# Patient Record
Sex: FEMALE | Race: WHITE | NOT HISPANIC OR LATINO | Employment: FULL TIME | URBAN - METROPOLITAN AREA
[De-identification: names, ages, dates, MRNs, and addresses within clinical notes are randomized per-mention and may not be internally consistent; named-entity substitution may affect disease eponyms.]

---

## 2019-07-13 LAB
EXTERNAL HIV SCREEN: NORMAL
HCV AB SER-ACNC: <0.1

## 2019-07-16 ENCOUNTER — TRANSCRIBE ORDERS (OUTPATIENT)
Dept: PERINATAL CARE | Facility: CLINIC | Age: 34
End: 2019-07-16

## 2019-07-16 DIAGNOSIS — O09.899 SUPERVISION OF OTHER HIGH RISK PREGNANCIES, UNSPECIFIED TRIMESTER: Primary | ICD-10-CM

## 2019-07-31 RX ORDER — ALBUTEROL SULFATE 90 UG/1
2 AEROSOL, METERED RESPIRATORY (INHALATION) EVERY 6 HOURS PRN
COMMUNITY

## 2019-07-31 RX ORDER — FAMOTIDINE 20 MG/1
20 TABLET, FILM COATED ORAL 2 TIMES DAILY
COMMUNITY

## 2019-07-31 RX ORDER — TRAZODONE HYDROCHLORIDE 100 MG/1
25 TABLET ORAL
COMMUNITY

## 2019-07-31 RX ORDER — FEXOFENADINE HCL 180 MG/1
180 TABLET ORAL DAILY
COMMUNITY

## 2019-07-31 RX ORDER — ESCITALOPRAM OXALATE 10 MG/1
10 TABLET ORAL DAILY
COMMUNITY

## 2019-08-05 ENCOUNTER — OFFICE VISIT (OUTPATIENT)
Dept: PERINATAL CARE | Facility: CLINIC | Age: 34
End: 2019-08-05
Payer: COMMERCIAL

## 2019-08-05 ENCOUNTER — ROUTINE PRENATAL (OUTPATIENT)
Dept: PERINATAL CARE | Facility: CLINIC | Age: 34
End: 2019-08-05

## 2019-08-05 VITALS
HEART RATE: 82 BPM | HEIGHT: 60 IN | BODY MASS INDEX: 24.94 KG/M2 | WEIGHT: 127 LBS | DIASTOLIC BLOOD PRESSURE: 60 MMHG | SYSTOLIC BLOOD PRESSURE: 100 MMHG

## 2019-08-05 VITALS
BODY MASS INDEX: 24.94 KG/M2 | HEIGHT: 60 IN | DIASTOLIC BLOOD PRESSURE: 60 MMHG | WEIGHT: 127 LBS | HEART RATE: 82 BPM | SYSTOLIC BLOOD PRESSURE: 100 MMHG

## 2019-08-05 DIAGNOSIS — Z3A.12 12 WEEKS GESTATION OF PREGNANCY: ICD-10-CM

## 2019-08-05 DIAGNOSIS — O09.899 SUPERVISION OF OTHER HIGH RISK PREGNANCIES, UNSPECIFIED TRIMESTER: ICD-10-CM

## 2019-08-05 DIAGNOSIS — F32.A DEPRESSION AFFECTING PREGNANCY IN FIRST TRIMESTER, ANTEPARTUM: ICD-10-CM

## 2019-08-05 DIAGNOSIS — O09.299 PREVIOUS PREGNANCY COMPLICATED BY CHROMOSOMAL ABNORMALITY, ANTEPARTUM: Primary | ICD-10-CM

## 2019-08-05 DIAGNOSIS — O35.9XX0 TERATOGEN EXPOSURE IN CURRENT PREGNANCY, SINGLE OR UNSPECIFIED FETUS: ICD-10-CM

## 2019-08-05 DIAGNOSIS — O99.341 DEPRESSION AFFECTING PREGNANCY IN FIRST TRIMESTER, ANTEPARTUM: ICD-10-CM

## 2019-08-05 DIAGNOSIS — Z31.5 ENCOUNTER FOR PROCREATIVE GENETIC COUNSELING: ICD-10-CM

## 2019-08-05 PROCEDURE — 76801 OB US < 14 WKS SINGLE FETUS: CPT | Performed by: OBSTETRICS & GYNECOLOGY

## 2019-08-05 PROCEDURE — 76813 OB US NUCHAL MEAS 1 GEST: CPT | Performed by: OBSTETRICS & GYNECOLOGY

## 2019-08-05 PROCEDURE — 99241 PR OFFICE CONSULTATION NEW/ESTAB PATIENT 15 MIN: CPT | Performed by: OBSTETRICS & GYNECOLOGY

## 2019-08-05 RX ORDER — LORATADINE 10 MG/1
TABLET ORAL
COMMUNITY
Start: 2019-07-25

## 2019-08-05 NOTE — LETTER
August 7, 2019     Velia Donahue, [address] [address]    Patient: Daryle Sable   YOB: 1985   Date of Visit: 8/5/2019       Dear Dr Regan List: Thank you for referring Daryle Sable to me for evaluation  Below are my notes for this consultation  If you have questions, please do not hesitate to call me  I look forward to following your patient along with you  Sincerely,        Cleveland Miles MD        CC: No Recipients  Cleveland Miles MD  8/7/2019  1:25 PM  Sign at close encounter  Please see her ultrasound report in a separate report    Cleveland Miles MD

## 2019-08-05 NOTE — LETTER
August 5, 2019     Ramonita Moran, [address] [facility] [address]    Patient: Ramon Aldana   YOB: 1985   Date of Visit: 8/5/2019       Dear Dr Vicki Lancaster: Thank you for referring Ramon Aldana to me for evaluation  Below are my notes for this consultation  If you have questions, please do not hesitate to call me  I look forward to following your patient along with you  Sincerely,        Corin Monzon        CC: No Recipients  Kim Alanis MD  8/5/2019  9:36 AM  Sign at close encounter    Please see her ultrasound report in a separate report    Kim Alanis MD

## 2019-08-05 NOTE — PROGRESS NOTES
Genetic Counseling   High-Risk Gestation Note    Appointment Date:  2019  Referred By: JOSE Meza  YOB: 1985  Partner:  Robb Rubalcava     Indication for Visit:  personal and/or family history of chromosomal abnormality:  Previous pregnancy with Trisomy 25 and personal and/or family history of genetic disorder:  Patient is carrier of spinal muscular atrophy  Pregnancy History:   Estimated Date of Delivery: 20  Estimated Gestational Age: 14w9d     Genetic Counseling: Miller Walsh is a 29year old female who is here to discuss risks related to a previous pregnancy with Trisomy 25  Issues Discussed:  average population risk- 3-4% in the average pregnancy of serious condition or birth defect  2-3% risk of mental retardation  Not all detected by prenatal testing  Chromosomal: non-disjunction- /535 for Down syndrome, ,458 for Trisomy 25, and /286 for any chromosome abnormality at age 36 at delivery  Other disorders/disease: Patient carrier for spinal muscular atrophy (SMA), FOB tested negative  Clinical course and variability of spinal muscular atrophy and Trisomy 18  Risk to future pregnancies:  increased risk of 1% for any chromosome abnormality, decreased risk for SMA  Options Discussed:  Amniocentesis-risks and limitations discussed  CVS-Risks and limitations discussed  Level II ultrasound to screen for structural anomalies  Serum AFP screen recommended at 15-17 weeks to check for open neural tube defects  Cell free fetal DNA testing  Additional Information / Impression:  Miller Walsh is a 29 y o  female who presented with her  Shamika Maldonado for genetic counseling to discuss risks related to a previous pregnancy with Trisomy 25  The couple's first pregnancy was found to have no fetal heart tones on an 11week ultrasound  A D&E procedure was performed and genetic testing on products of conception was done which confirmed Trisomy 18    A copy of these results was not available to review at the time of our counseling session  We reviewed the morbidity and mortality of Trisomy 18 as well as the typical etiology of nondisjunction  We discussed that the risk to the current pregnancy for any chromosome abnormality (including Trisomy 25) is elevated over the patient's age related risk to about 1%  The available testing and screening options for chromosome abnormalities were then discussed  The risks, benefits, and limitations of amniocentesis were discussed with the patient  Amniocentesis is performed under direct real time ultrasound visualization to avoid both the fetus and the placenta  Once amniotic fluid is withdrawn, laboratory analysis is performed and amniotic fluid alpha-fetoprotein, as well as chromosome analysis is undertaken  The risk of genetic amniocentesis includes, but is not limited to less than 1 in 300 pregnancy loss rate or  delivery rate if 23 weeks or greater, infection, bleeding, rupture of membranes, failure of cells to grow, karyotype error, laboratory error, etc   Occasionally a repeat amniocentesis is necessary due to cell culture failure  Chromosome analysis from amniocentesis is 99 9% accurate and alpha-fetoprotein analysis can detect approximately 95% of open neural tube defects  Chorionic villus sampling (CVS) is another diagnostic testing option that is available earlier than amniocentesis, between 10-14 weeks gestation  Like amniocentesis, CVS is 99% accurate for detecting chromosomal problems  Unlike amniocentesis, CVS cannot detect alpha-fetoprotein levels in order to determine the risk for open neural tube defects  MSAFP testing would need to be performed a 15-20 weeks gestation for this purpose  The risk of CVS includes, but is not limited to, less than a 1 in 300 risk for pregnancy loss    There is also a 1% risk for maternal cell contamination and cell culture failure, in which case the CVS would need to be followed-up with amniocentesis  We reviewed the testing option of cell free fetal DNA screening (also known as noninvasive prenatal testing or NIPT)  We discussed that it is a serum test to identify fragments of fetal DNA in maternal blood  We reviewed the benefits and limitations of cell free fetal DNA screening in detecting Down syndrome, Trisomy 13, Trisomy 25 and sex chromosome aneuploidies  We also discussed that cell free fetal DNA screening does not detect additional chromosomal abnormalities and the possibility of a failed test result  As cell free fetal DNA screening does not detect open neural tube defects, MSAFP screening is available at 15-20 weeks gestation  After discussing the available prenatal screening and testing options Tyrus Apley elected to pursue cell free fetal DNA screening  Her blood was drawn immediately after the counseling session for Celia Ovalle  Results take approximately 7-10 days  The patient declined CVS and amniocentesis secondary to procedural related complications  She may reconsider diagnostic testing should the cell free fetal DNA screening come back abnormal   Tyrus Apley is also planning on pursuing MSAFP screening and Level II ultrasound at the appropriate times  Tyrus Apley states that her mother had three or four early pregnancy losses, etiology unknown  Further review of family history for the patient and her partner was noncontributory  The family history was not significant for other genetic diseases or disorders, intellectual disability, birth defects, fetal loss, or consanguinity  Patient reports being of / decent and that her  is of [place] decent  She denies either of them having known Ashkenazi Mu-ism ancestry  Tyrus Apley was found to be a carrier of spinal muscular atrophy (SMA) on expanded carrier screening done by Dr Марина guevara while they were undergoing infertility treatment prior to their first pregnancy    Alinasilvia Luna had SMA carrier screening performed and was negative  A copy of these results was not available to review, however we discussed that the residual risk for an affected pregnancy is low  Lastly, we discussed the fact that everyone in the general population regardless of age, family history, or medical background has approximately a 3-5% risk of having a child with some type of congenital anomaly, genetic disease or intellectual disability  Currently there are no tests available to rule out all birth defects or health problems  Hogan Oliva was provided with our contact information  I encouraged her to call with any questions or concerns  Time spent with Genetic Counselor: 30 minutes    Plan / Tests Ordered:  1) Patient declined CVS and amniocentesis  2) Patient elected cell free fetal DNA testing - [id number] drawn after counseling session  3) MSAFP screening at 15-20 weeks gestation  4) Level II ultrasound at approximately 20 weeks gestation

## 2019-08-08 ENCOUNTER — TELEPHONE (OUTPATIENT)
Dept: PERINATAL CARE | Facility: CLINIC | Age: 34
End: 2019-08-08

## 2019-08-08 NOTE — TELEPHONE ENCOUNTER
Called patient OB and spoke with Roger Bradshaw about faxing over patient records  Looking for any testing from Dr Randall [facility] office, CF, SMA, chromosome analysis, and genetic testing from prior pregnancy that had Trisomy 25  She will fax over any results she has

## 2019-08-12 ENCOUNTER — TELEPHONE (OUTPATIENT)
Dept: PERINATAL CARE | Facility: CLINIC | Age: 34
End: 2019-08-12

## 2019-08-12 NOTE — TELEPHONE ENCOUNTER
Telephone call to patient  VM identified correct number but no personal identifier  Left message for her to return call to discuss results  Completed and mailed TRF for MSALUKE

## 2019-08-12 NOTE — LETTER
19    Tyler Valdez  : 1985    Thank you for completing the cell-free fetal DNA screen ("non-invasive prenatal screening" or "[id number]")  To obtain comprehensive screening results, please complete blood work for MSAFP (maternal-serum alpha fetoprotein) which is screening for open neural tube defects (or spina bifida), between the weeks of ________ to ________  Based on your insurance coverage, please use one of the following locations  Call our office for any questions at [phone] or [phone]      [facility] [address]   [address] [place], [facility], [address]    Phone: [address]  [address] [facility], [place], [facility] [address] [place]    Phone: [address]  [address]  Phone: [phone] [address]  [facility], [address]   Phone: [phone] Santosh Yanes for lab)    [address] [facility]  [address]  [facility], [address] [facility]; [place], [address]   Phone: [address] [facility] [address]  [address]   Phone: [phone]    Sincerely,    Shannan Keller, [address] Nurse    Karine Valles MS, [facility]   Genetic Counselor

## 2019-08-12 NOTE — TELEPHONE ENCOUNTER
Pt called office at [phone] requesting results of MaterniT 21  Pt states Christin left a VMM to call our office  At [phone] I returned pts call and provided her with the results  PT requests gender which was confirmed with   Pt instructed on MSAFP  Pt receptive to all information and declines questions at this time

## 2019-08-22 ENCOUNTER — TELEPHONE (OUTPATIENT)
Dept: PERINATAL CARE | Facility: CLINIC | Age: 34
End: 2019-08-22

## 2019-08-22 NOTE — TELEPHONE ENCOUNTER
Patient left message asking about second blood draw  Wanted to know if she needs to make an appointment  Called her back, no answer  Voicemail did not have any identifying info thus a message was left for her to call me back

## 2019-09-10 ENCOUNTER — TELEPHONE (OUTPATIENT)
Dept: PERINATAL CARE | Facility: CLINIC | Age: 34
End: 2019-09-10

## 2019-09-10 NOTE — TELEPHONE ENCOUNTER
Left VMM on # provided on communication consent  PT had not viewed results on My Chart, results of MSAFP provided  Instructed to contact office with questions

## 2019-09-10 NOTE — TELEPHONE ENCOUNTER
----- Message from Vicky Jacob MD sent at 9/9/2019 10:19 PM EDT -----  Patient updated with her lab results through my chart

## 2019-09-30 ENCOUNTER — ROUTINE PRENATAL (OUTPATIENT)
Dept: PERINATAL CARE | Facility: CLINIC | Age: 34
End: 2019-09-30
Payer: COMMERCIAL

## 2019-09-30 VITALS
HEIGHT: 60 IN | DIASTOLIC BLOOD PRESSURE: 58 MMHG | WEIGHT: 135.2 LBS | SYSTOLIC BLOOD PRESSURE: 96 MMHG | HEART RATE: 83 BPM | BODY MASS INDEX: 26.55 KG/M2

## 2019-09-30 DIAGNOSIS — O09.299 PREVIOUS PREGNANCY COMPLICATED BY CHROMOSOMAL ABNORMALITY, ANTEPARTUM: ICD-10-CM

## 2019-09-30 DIAGNOSIS — Z3A.20 20 WEEKS GESTATION OF PREGNANCY: Primary | ICD-10-CM

## 2019-09-30 PROCEDURE — 76817 TRANSVAGINAL US OBSTETRIC: CPT | Performed by: OBSTETRICS & GYNECOLOGY

## 2019-09-30 PROCEDURE — 76811 OB US DETAILED SNGL FETUS: CPT | Performed by: OBSTETRICS & GYNECOLOGY

## 2019-09-30 NOTE — PROGRESS NOTES
A transvaginal ultrasound was performed  Sonographer note on use of High Level Disinfection Process (Trophon) for transvaginal probe# 2  used, serial [id number]    Ce Michaud RDMS

## 2019-10-02 PROBLEM — Z3A.20 20 WEEKS GESTATION OF PREGNANCY: Status: ACTIVE | Noted: 2019-08-05

## 2019-10-19 NOTE — PATIENT INSTRUCTIONS
Thank you for choosing us for your  care today  If you have any questions about your ultrasound or care, please do not hesitate to contact us or your primary obstetrician  Some general instructions for your pregnancy are:     Exercise: we encourage most pregnant women to get regular physical activity in pregnancy  Exercise has been shown to reduce the risk of several pregnancy-related complications  Unless instructed otherwise you can aim for 22 minutes per day (150 minutes per week!)   Nutrition: aim for calcium-rich and iron-rich foods as well as healthy sources of protein sources   Weight: ask your doctor what is the appropriate amount of weight for you to gain in pregnancy  We have nutritionists here if you would like to meet with them   Protection from influenza: get yourself and your entire household vaccinated against influenza  Good hand hygiene can reduce the spread of this potentially deadly virus  Insist that everyone who is going to hold your baby get vaccinated   Educate yourself about preeclampsia: preeclampsia is a common, serious complication in pregnancy  A blood pressure of 140mmHg (top number or systolic) OR 44IXRD (bottom number or diastolic) is elevated and needs evaluation by your doctor  Ask your doctor early in pregnancy if you should take aspirin (not motrin or tylenol) to prevent preeclampsia  If you were advised to take aspirin to prevent preeclampsia, a daily dose of 162mg or 81mg is advised  One resource is www  preeclampsia org    If you smoke, try to reduce how many cigarettes you smoke  Do not vape   Other warning signs to watch out for in pregnancy or postpartum: chest pain, obstructed breathing or shortness of breath, seizures, thoughts of hurting yourself or your baby, bleeding, a painful or swollen leg, fever, or headache ([facility] POST-BIRTH Warning Signs campaign)  If these happen call 911      At this time, no additional ultrasounds are advised through the  center, however, if your doctors would like you to have any additional ultrasounds, they will let us know

## 2019-10-21 ENCOUNTER — ULTRASOUND (OUTPATIENT)
Dept: PERINATAL CARE | Facility: CLINIC | Age: 34
End: 2019-10-21
Payer: COMMERCIAL

## 2019-10-21 VITALS
BODY MASS INDEX: 27.01 KG/M2 | SYSTOLIC BLOOD PRESSURE: 96 MMHG | HEART RATE: 92 BPM | HEIGHT: 60 IN | WEIGHT: 137.6 LBS | DIASTOLIC BLOOD PRESSURE: 56 MMHG

## 2019-10-21 DIAGNOSIS — IMO0002 EVALUATE ANATOMY NOT SEEN ON PRIOR SONOGRAM: Primary | ICD-10-CM

## 2019-10-21 PROCEDURE — 76816 OB US FOLLOW-UP PER FETUS: CPT | Performed by: OBSTETRICS & GYNECOLOGY

## 2019-10-21 NOTE — PROGRESS NOTES
Milton Hiram: Ms Andrey Leger was seen today at 23w6d for followup missed anatomy ultrasound  See ultrasound report under "OB Procedures" tab  Please don't hesitate to contact our office with any concerns or questions    Roque Raya MD

## 2019-12-11 ENCOUNTER — TRANSCRIBE ORDERS (OUTPATIENT)
Dept: PERINATAL CARE | Facility: CLINIC | Age: 34
End: 2019-12-11

## 2019-12-11 DIAGNOSIS — O09.90 SUPERVISION OF HIGH RISK PREGNANCY, UNSPECIFIED, UNSPECIFIED TRIMESTER: Primary | ICD-10-CM

## 2019-12-12 ENCOUNTER — TRANSCRIBE ORDERS (OUTPATIENT)
Dept: PERINATAL CARE | Facility: CLINIC | Age: 34
End: 2019-12-12

## 2019-12-12 DIAGNOSIS — O99.810 GLUCOSE INTOLERANCE OF PREGNANCY: Primary | ICD-10-CM

## 2019-12-13 ENCOUNTER — OFFICE VISIT (OUTPATIENT)
Dept: PERINATAL CARE | Facility: CLINIC | Age: 34
End: 2019-12-13
Payer: COMMERCIAL

## 2019-12-13 VITALS
WEIGHT: 144.2 LBS | DIASTOLIC BLOOD PRESSURE: 52 MMHG | BODY MASS INDEX: 28.31 KG/M2 | SYSTOLIC BLOOD PRESSURE: 98 MMHG | HEIGHT: 60 IN | HEART RATE: 89 BPM

## 2019-12-13 DIAGNOSIS — O24.410 DIET CONTROLLED GESTATIONAL DIABETES MELLITUS (GDM) IN THIRD TRIMESTER: Primary | ICD-10-CM

## 2019-12-13 DIAGNOSIS — Z3A.31 31 WEEKS GESTATION OF PREGNANCY: ICD-10-CM

## 2019-12-13 DIAGNOSIS — O99.810 GLUCOSE INTOLERANCE OF PREGNANCY: ICD-10-CM

## 2019-12-13 PROCEDURE — G0108 DIAB MANAGE TRN  PER INDIV: HCPCS | Performed by: DIETITIAN, REGISTERED

## 2019-12-13 RX ORDER — LANCETS 33 GAUGE
EACH MISCELLANEOUS
Qty: 100 EACH | Refills: 3 | Status: SHIPPED | OUTPATIENT
Start: 2019-12-13 | End: 2020-02-11

## 2019-12-13 RX ORDER — BLOOD SUGAR DIAGNOSTIC
STRIP MISCELLANEOUS
Qty: 100 EACH | Refills: 4 | Status: SHIPPED | OUTPATIENT
Start: 2019-12-13 | End: 2020-02-11

## 2019-12-13 RX ORDER — BLOOD-GLUCOSE METER
EACH MISCELLANEOUS
Qty: 1 KIT | Refills: 0 | Status: SHIPPED | OUTPATIENT
Start: 2019-12-13 | End: 2020-02-11

## 2019-12-13 NOTE — PROGRESS NOTES
19  Nanci Pope   1985  Estimated Date of Delivery: 20   REBEKAH: 31w3d    Dear JOSE Lui at [facility]    Thank you for referring your patient to the Diabetes and Pregnancy Program at [address] [place] [address]  The patient attended class 1 and patient received the following education:     Pathophysiology of diabetes and pregnancy  This includes maternal-fetal complications such as fetal macrosomia,  hypoglycemia, polyhydramnios, increased incidence of  section, pre-term labor and in severe cases, fetal demise and stillbirth   Instruction on diet and glucometer use was provided  Self-monitoring of blood glucose levels: fasting (goal 60mg/dl to 90mg/dl) and two hours after the start of the meal less (goal less than 120mg/dl)  The patient was provided with a Contour Next EZ blood glucose meter and supplies  Blood glucose during demonstration was  111  At 1 hour after lunch   Medical Nutrition Therapy for diabetes and pregnancy  The patient was provided with a 1900 calorie meal plan and the following was reviewed:     o Basic review of macronutrients  Stated she eats a mostly vegetarian meal plan; does eat meat occassionally  Also, eats, eggs, cheese, & meat substitutes  o Meal pattern should consist of three small meals and three snacks daily  o Carbohydrate gram amounts per meal   o Instructions on how to read a food label  o Appropriate serving sizes for carbohydrates and proteins  o Incorporating protein at each meal and snack  o Maintain a three day food diary and bring to class 2    Report blood glucose levels to the [address] weekly or as directed:  o Phone : [phone]  If no response in 24 hours, call [phone]   o Fax: [phone]  o Email: [email or web address]  The patient is scheduled to attend class 2 on Friday, 19    Additionally, fetal ultrasound evaluation by the Perinatologist has been scheduled to assure continuity of care  Please contact the Diabetes and Pregnancy Program at [phone] if you have any questions  Time spent with patient 2-3 PM; time spent face to face counseling greater than 50% of the appointment      Sincerely,   Darryl Crain  Diabetes Educator   Diabetes and Pregnancy Program

## 2019-12-20 ENCOUNTER — DOCUMENTATION (OUTPATIENT)
Dept: PERINATAL CARE | Facility: CLINIC | Age: 34
End: 2019-12-20

## 2019-12-20 NOTE — PROGRESS NOTES
Date:  19  RE: Jennifer Patrick    : 1985  Estimated Date of Delivery: 20  EGA: 32w3d  Referring Provider: Lara Eaton          Blood sugars reported via email  Current regimen:  1900 calorie gestational diabetes meal plan; 3 meals and 3 snacks including protein  SMBG 4 times a day; fasting and 2 hrs pp with a Contour Next EZ blood glucose meter  Noted some 1 hr pp values on log  Plan:  SMBG 4 x per day (Fasting, 2 hour after start of each meal) using Contour Next glucose meter  Advised patient to report if values are 1 hour and not 2 hr pp  Continue meal plan consisting of 3 meals and 3 snacks, including protein at each  Instructed when repeating a similar meal that caused blood sugar value to be >target range; decrease 1 carbohydrate serving and increase 1 protein serving  If okay by OB, walk 20-30 minutes daily  Class 2 is scheduled for   Date due to report next:   at class 2      Sharif Dyer RD,LDN,CDE  Diabetes Educator   Diabetes and Pregnancy Program

## 2019-12-27 ENCOUNTER — DOCUMENTATION (OUTPATIENT)
Dept: PERINATAL CARE | Facility: CLINIC | Age: 34
End: 2019-12-27

## 2019-12-27 NOTE — PROGRESS NOTES
Date:  19  RE: Villa Luis    : 1985  Estimated Date of Delivery: 20  EGA: 33w3d  OB/GYN: JOSE Whipple at [address] [facility] controlled gestational diabetes    Date Fasting Post-  breakfast Post-  lunch Post-  dinner Before bedtime Carbs Comments   19 93 139-1 hr 122 117-1 hr      19 65 92-1 hr 110 130 minimal protein      19 85 134-1 hr 108 129   [place]   19 94 109 99 94-1 hr      19 81 111 111-1 hr         Current regimen:  1900 calorie GDM-Vegetarian diet; 3 meals and 3 snacks  Has added some meat sources of protein to her diet, but still eats some meatless meals  SMBG 4 times a day; fasting and 2 hrs pp with a Contour Next EZ blood glucose meter  Noted some 1 hr pp values on log  Plan:  Continue current regimen  Advised to add protein to bedtime snack daily  Advised to add protein powder to her meatless meals  Stated her last recent ultrasound at her doctor's office indicated her baby is measuring 2 weeks ahead  Next M ultrasound scheduled for Friday, 1/3/20  If okay by OB, walk 20-30 minutes daily      Date due to report next:  Thursday, 20    David Clarke RD,LDN,CDE  Diabetes Educator   Diabetes and Pregnancy Program

## 2020-01-02 NOTE — PATIENT INSTRUCTIONS
Thank you for choosing us for your  care today  If you have any questions about your ultrasound or care, please do not hesitate to contact us or your primary obstetrician  Please communicate your blood sugars at least weekly with the diabetic educators at the [address] [place] [address] Diabetes in Pregnancy program   The telephone number is [phone]  The e-mail address is [email or web address]  If you do not hear back from the program within 48 hours of sending your blood sugars, please call JOSE Bradford at [phone]  Thank you  Some general instructions for your pregnancy are:     Exercise: we encourage most pregnant women to get regular physical activity in pregnancy  Exercise has been shown to reduce the risk of several pregnancy-related complications  Unless instructed otherwise, you can aim for 22 minutes per day (150 minutes per week! )   Nutrition: aim for calcium-rich and iron-rich foods as well as healthy sources of protein   Weight: ask your doctor what is the appropriate amount of weight for you to gain in pregnancy  We have nutritionists here if you would like to meet with them   Protection from influenza: get yourself and your entire household vaccinated against influenza  Tell your partner to get vaccinated as well  Good hand hygiene can reduce the spread of this potentially deadly virus  Insist that everyone who is going to hold or be around your baby get vaccinated   Educate yourself about preeclampsia: preeclampsia is a common, serious complication in pregnancy  A blood pressure of 140mmHg (top number or systolic) OR 12PYHD (bottom number or diastolic) is elevated and needs evaluation by your doctor  Ask your doctor early in pregnancy if you should take aspirin (not motrin or tylenol) to prevent preeclampsia  If you were advised to take aspirin to prevent preeclampsia, a daily dose of 162mg or 81mg is advised    One resource to learn more is www  preeclampsia org    If you smoke, try to reduce how many cigarettes you smoke or quit completely  Do not vape   Other warning signs to watch out for in pregnancy or postpartum: chest pain, obstructed breathing or shortness of breath, seizures, thoughts of hurting yourself or your baby, bleeding, a painful or swollen leg, fever, or headache ([facility] POST-BIRTH Warning Signs campaign)  If these happen call 911  Itching is also not normal in pregnancy and if you experience this, especially over your hands and feet, potentially worse at night, notify your doctors

## 2020-01-03 ENCOUNTER — ULTRASOUND (OUTPATIENT)
Dept: PERINATAL CARE | Facility: CLINIC | Age: 35
End: 2020-01-03
Payer: COMMERCIAL

## 2020-01-03 ENCOUNTER — DOCUMENTATION (OUTPATIENT)
Dept: PERINATAL CARE | Facility: CLINIC | Age: 35
End: 2020-01-03

## 2020-01-03 VITALS
HEART RATE: 93 BPM | BODY MASS INDEX: 28.62 KG/M2 | SYSTOLIC BLOOD PRESSURE: 92 MMHG | WEIGHT: 145.8 LBS | DIASTOLIC BLOOD PRESSURE: 54 MMHG | HEIGHT: 60 IN

## 2020-01-03 DIAGNOSIS — Z3A.34 34 WEEKS GESTATION OF PREGNANCY: ICD-10-CM

## 2020-01-03 DIAGNOSIS — O24.410 GDM (GESTATIONAL DIABETES MELLITUS), CLASS A1: ICD-10-CM

## 2020-01-03 DIAGNOSIS — O24.410 GDM (GESTATIONAL DIABETES MELLITUS), CLASS A1: Primary | ICD-10-CM

## 2020-01-03 DIAGNOSIS — Z36.89 ENCOUNTER FOR ULTRASOUND TO CHECK FETAL GROWTH: Primary | ICD-10-CM

## 2020-01-03 DIAGNOSIS — Z87.59 HISTORY OF GESTATIONAL HYPERTENSION: ICD-10-CM

## 2020-01-03 PROCEDURE — 99212 OFFICE O/P EST SF 10 MIN: CPT | Performed by: OBSTETRICS & GYNECOLOGY

## 2020-01-03 PROCEDURE — 76816 OB US FOLLOW-UP PER FETUS: CPT | Performed by: OBSTETRICS & GYNECOLOGY

## 2020-01-03 RX ORDER — TRAZODONE HYDROCHLORIDE 50 MG/1
12.5 TABLET ORAL
COMMUNITY
Start: 2019-12-23

## 2020-01-03 NOTE — PROGRESS NOTES
Date:  20  RE: Kelsi Christianson    : 1985  Estimated Date of Delivery: 20  EGA: 34w3d  OB/GYN: JOSE Nieves at [address] [facility] controlled gestational diabetes            Current regimen:  1900 calorie GDM-Vegetarian diet; 3 meals and 3 snacks  Has added some meat sources of protein to her diet, but still eats some meatless meals  Advised to add protein to bedtime snack daily  Advised to add protein powder to her meatless meals  SMBG 4 times a day; fasting and 2 hrs pp with a Contour Next EZ blood glucose meter  Noted some 1 hr pp values on log  Plan:  Continue GDM diet 3 meals and 3 snacks including combination of carbohydrates, protein and fat per meal/snack  No more than 8 to 10 hours of fasting overnight  If no restrictions from your OBGYN, walk up to 30 minutes a day  Continue SMBG    1/3/20 ultrasound: fetal growth appeared normal and PEDRO normal       Date due to report next:  Thursday, 20 or sooner if needed      JOSE Greenwood, CDE   Diabetes Educator   Diabetes and Pregnancy Program

## 2020-01-03 NOTE — PROGRESS NOTES
Milton Gandhi: Ms Frank Harkins was seen today at 34w3d for fetal growth assessment ultrasound  See ultrasound report under "OB Procedures" tab  Please don't hesitate to contact our office with any concerns or questions    Thelma Fajardo MD

## 2020-01-05 PROBLEM — Z3A.34 34 WEEKS GESTATION OF PREGNANCY: Status: ACTIVE | Noted: 2019-08-05

## 2020-01-09 ENCOUNTER — DOCUMENTATION (OUTPATIENT)
Dept: PERINATAL CARE | Facility: CLINIC | Age: 35
End: 2020-01-09

## 2020-01-09 NOTE — PROGRESS NOTES
Date:  20  RE: Jennifer Patrick    : 1985  Estimated Date of Delivery: 20  EGA: 35w2d  OB/GYN: JOSE Wells at [address] [facility] controlled gestational diabetes    Current regimen:  1900 calorie GDM-Vegetarian diet; 3 meals and 3 snacks  Has added some meat sources of protein to her diet, but still eats some meatless meals  Advised to add protein to bedtime snack daily  Advised to add protein powder to her meatless meals  SMBG 4 times a day; fasting and 2 hrs pp with a Contour Next EZ blood glucose meter  Noted some 1 hr pp values on log  Plan:  Continue GDM diet 3 meals and 3 snacks including combination of carbohydrates, protein and fat per meal/snack  No more than 8 to 10 hours of fasting overnight  Advised patient to use phone jonny Du Wyatt when dining out  Also, advised to decrease 1 CHO servings (15 grams) and increase 1 protein serving for meals when 2 hr pp >120 mg/dl  If no restrictions from your OBGYN, walk up to 30 minutes a day  Continue SMBG    1/3/20 ultrasound: fetal growth appeared normal and PEDRO normal       Date due to report next:  Wednesday, 1/15/20 or sooner if needed      Bri Sen RD,LDN, CDE   Diabetes Educator   Diabetes and Pregnancy Program

## 2020-01-16 ENCOUNTER — DOCUMENTATION (OUTPATIENT)
Dept: PERINATAL CARE | Facility: CLINIC | Age: 35
End: 2020-01-16

## 2020-01-16 NOTE — PROGRESS NOTES
Date:  20  RE: Ramses Harkins    : 1985  Estimated Date of Delivery: 20  EGA: 36w2d  OB/GYN: JOSE Chew at [address] [facility] controlled gestational diabetes          Current regimen:  1900 calorie GDM-Vegetarian diet; 3 meals and 3 snacks  Has added some meat sources of protein to her diet, but still eats some meatless meals  Advised to add protein to bedtime snack daily  Advised to add protein powder to her meatless meals  SMBG 4 times a day; fasting and 2 hrs pp with a [employer] E[employer] blood glucose meter  Noted some 1 hr pp values on log  Plan:  Continue GDM diet 3 meals and 3 snacks No more than 8 to 10 hours of fasting overnight  Advised patient to use phone [employer]on[employer] when dining out  Also, advised to decrease 1 CHO servings (15 grams) and increase 1 protein serving for meals when 2 hr pp >120 mg/dl  If no restrictions from your OBGYN, walk up to 30 minutes a day  Continue SMBG    1/3/20 ultrasound: fetal growth appeared normal and PEDRO normal     Date due to report next:  Wednesday, 20 or sooner if needed      Stephanie Oliver, MS, RD, CDE  Diabetes Educator   [facility]

## 2025-07-22 DIAGNOSIS — F41.1 GAD (GENERALIZED ANXIETY DISORDER): Primary | ICD-10-CM

## 2025-07-23 RX ORDER — ESCITALOPRAM OXALATE 10 MG/1
10 TABLET ORAL DAILY
Qty: 90 TABLET | Refills: 0
Start: 2025-07-23 | End: 2025-07-24 | Stop reason: SDUPTHER

## 2025-07-24 DIAGNOSIS — F41.1 GAD (GENERALIZED ANXIETY DISORDER): ICD-10-CM

## 2025-07-24 RX ORDER — ESCITALOPRAM OXALATE 10 MG/1
10 TABLET ORAL DAILY
Qty: 90 TABLET | Refills: 0 | Status: SHIPPED | OUTPATIENT
Start: 2025-07-24

## 2025-07-24 NOTE — TELEPHONE ENCOUNTER
Reason for call: Pharmacy did not receive medication - needs to be resent.  [x] Refill   [] Prior Auth  [] Other:     Office:   [x] PCP/Provider -   [] Specialty/Provider -     Medication: Escitalopram    Dose/Frequency: 10 mg Oral Daily    Quantity: 90    Pharmacy: [id number]    Local Pharmacy   Does the patient have enough for 3 days?   [x] Yes   [] No - Send as HP to POD

## 2025-08-01 ENCOUNTER — TELEPHONE (OUTPATIENT)
Age: 40
End: 2025-08-01

## 2025-08-08 PROBLEM — Z97.5 INTRAUTERINE DEVICE: Status: ACTIVE | Noted: 2025-08-08

## 2025-08-08 PROBLEM — N94.6 DYSMENORRHEA, UNSPECIFIED: Status: ACTIVE | Noted: 2025-08-08

## 2025-08-08 PROBLEM — Z80.3 FAMILY HISTORY OF MALIGNANT NEOPLASM OF BREAST: Status: ACTIVE | Noted: 2025-08-08

## 2025-08-08 PROBLEM — J45.20 MILD INTERMITTENT ASTHMA: Status: ACTIVE | Noted: 2025-08-08

## 2025-08-08 PROBLEM — R42 DIZZINESS AND GIDDINESS: Status: ACTIVE | Noted: 2025-08-08

## 2025-08-08 PROBLEM — F41.1 GENERALIZED ANXIETY DISORDER: Status: ACTIVE | Noted: 2025-08-08

## 2025-08-08 PROBLEM — G47.00 INSOMNIA: Status: ACTIVE | Noted: 2025-08-08

## 2025-08-08 PROBLEM — J30.89 OTHER ALLERGIC RHINITIS: Status: ACTIVE | Noted: 2025-08-08

## 2025-08-08 PROBLEM — N76.0 RECURRENT VAGINITIS: Status: ACTIVE | Noted: 2025-08-08

## 2025-08-08 PROBLEM — F51.05 INSOMNIA DUE TO OTHER MENTAL DISORDER (CODE): Status: ACTIVE | Noted: 2025-08-08

## 2025-08-08 PROBLEM — E78.5 HYPERLIPIDEMIA: Status: ACTIVE | Noted: 2025-08-08

## 2025-08-20 ENCOUNTER — HOSPITAL ENCOUNTER (OUTPATIENT)
Age: 40
Discharge: HOME | End: 2025-08-20
Attending: INTERNAL MEDICINE
Payer: COMMERCIAL

## 2025-08-20 VITALS
WEIGHT: 145.5 LBS | HEIGHT: 60 IN | DIASTOLIC BLOOD PRESSURE: 77 MMHG | SYSTOLIC BLOOD PRESSURE: 113 MMHG | HEART RATE: 82 BPM | BODY MASS INDEX: 28.57 KG/M2

## 2025-08-20 DIAGNOSIS — R55 SYNCOPE, VASOVAGAL: ICD-10-CM

## 2025-08-20 LAB
AORTIC ROOT: 2.86 CM
BSA FOR ECHO PROCEDURE: 1.63 M2
E WAVE DECELERATION TIME: 178 MS
E/A RATIO: 1.53
FRACTIONAL SHORTENING: 37 (ref 28–44)
INTERVENTRICULAR SEPTUM IN DIASTOLE (PARASTERNAL SHORT AXIS VIEW): 0.8 CM
INTERVENTRICULAR SEPTUM: 0.8 CM (ref 0.6–1.1)
LAAS-AP2: 11.1 CM2
LAAS-AP4: 13 CM2
LEFT ATRIUM SIZE: 3.2 CM
LEFT ATRIUM VOLUME (MOD BIPLANE): 28 ML
LEFT ATRIUM VOLUME INDEX (MOD BIPLANE): 17.2 ML/M2
LEFT INTERNAL DIMENSION IN SYSTOLE: 2.4 CM (ref 2.1–4)
LEFT VENTRICLE DIASTOLIC VOLUME (MOD BIPLANE): 67 ML
LEFT VENTRICLE DIASTOLIC VOLUME INDEX (MOD BIPLANE): 41.1 ML/M2
LEFT VENTRICLE SYSTOLIC VOLUME (MOD BIPLANE): 26 ML
LEFT VENTRICLE SYSTOLIC VOLUME INDEX (MOD BIPLANE): 16 ML/M2
LEFT VENTRICULAR INTERNAL DIMENSION IN DIASTOLE: 3.8 CM (ref 3.5–6)
LEFT VENTRICULAR POSTERIOR WALL IN END DIASTOLE: 0.9 CM
LEFT VENTRICULAR STROKE VOLUME: 42 ML
LV EF BIPLANE MOD: 61 %
LV EF US.2D.A4C+ESTIMATED: 61 %
LVSV (TEICH): 42 ML
MV E'TISSUE VEL-LAT: 18 CM/S
MV E'TISSUE VEL-SEP: 15 CM/S
MV PEAK A VEL: 0.66 M/S
MV PEAK E VEL: 101 CM/S
MV STENOSIS PRESSURE HALF TIME: 52 MS
MV VALVE AREA P 1/2 METHOD: 4.23
RA PRESSURE ESTIMATED: 3 MMHG
RIGHT ATRIUM AREA SYSTOLE A4C: 12.1 CM2
RIGHT VENTRICLE ID DIMENSION: 2.9 CM
RV PSP: 24 MMHG
SL CV LEFT ATRIUM LENGTH A2C: 3.9 CM
SL CV PED ECHO LEFT VENTRICLE DIASTOLIC VOLUME (MOD BIPLANE) 2D: 63 ML
SL CV PED ECHO LEFT VENTRICLE SYSTOLIC VOLUME (MOD BIPLANE) 2D: 21 ML
TR MAX PG: 21 MMHG
TR PEAK VELOCITY: 2.3 M/S
TRICUSPID ANNULAR PLANE SYSTOLIC EXCURSION: 2.3 CM
TRICUSPID VALVE PEAK REGURGITATION VELOCITY: 2.3 M/S

## 2025-08-20 PROCEDURE — 93306 TTE W/DOPPLER COMPLETE: CPT

## 2025-08-20 PROCEDURE — 93306 TTE W/DOPPLER COMPLETE: CPT | Performed by: INTERNAL MEDICINE

## 2025-08-22 ENCOUNTER — OFFICE VISIT (OUTPATIENT)
Age: 40
End: 2025-08-22
Payer: COMMERCIAL

## 2025-08-22 VITALS
HEIGHT: 60 IN | WEIGHT: 128 LBS | OXYGEN SATURATION: 98 % | HEART RATE: 66 BPM | SYSTOLIC BLOOD PRESSURE: 100 MMHG | BODY MASS INDEX: 25.13 KG/M2 | DIASTOLIC BLOOD PRESSURE: 66 MMHG

## 2025-08-22 DIAGNOSIS — R00.2 PALPITATIONS: ICD-10-CM

## 2025-08-22 DIAGNOSIS — R79.89 ELEVATED LFTS: ICD-10-CM

## 2025-08-22 DIAGNOSIS — R42 DIZZINESS AND GIDDINESS: ICD-10-CM

## 2025-08-22 DIAGNOSIS — E78.2 MIXED HYPERLIPIDEMIA: ICD-10-CM

## 2025-08-22 DIAGNOSIS — F41.1 GENERALIZED ANXIETY DISORDER: Primary | ICD-10-CM

## 2025-08-22 LAB
ATRIAL RATE: 74 BPM
P AXIS: 37 DEGREES
PR INTERVAL: 132 MS
QRS AXIS: 54 DEGREES
QRSD INTERVAL: 82 MS
QT INTERVAL: 384 MS
QTC INTERVAL: 426 MS
T WAVE AXIS: 21 DEGREES
VENTRICULAR RATE: 74 BPM

## 2025-08-22 PROCEDURE — 99213 OFFICE O/P EST LOW 20 MIN: CPT | Performed by: INTERNAL MEDICINE

## 2025-08-22 PROCEDURE — 93000 ELECTROCARDIOGRAM COMPLETE: CPT | Performed by: INTERNAL MEDICINE

## 2025-08-22 RX ORDER — ONDANSETRON 4 MG/1
4 TABLET, ORALLY DISINTEGRATING ORAL EVERY 8 HOURS PRN
COMMUNITY
Start: 2025-07-15

## 2025-08-22 RX ORDER — LEVONORGESTREL 52 MG/1
1 INTRAUTERINE DEVICE INTRAUTERINE
COMMUNITY

## 2025-08-22 RX ORDER — CETIRIZINE HYDROCHLORIDE 10 MG/1
10 TABLET ORAL EVERY 24 HOURS
COMMUNITY

## 2025-08-22 RX ORDER — ROSUVASTATIN CALCIUM 20 MG/1
1 TABLET, COATED ORAL EVERY 24 HOURS
COMMUNITY
End: 2025-08-22

## 2025-08-22 RX ORDER — AZELAIC ACID 0.15 G/G
GEL TOPICAL
COMMUNITY
Start: 2025-07-08

## 2025-08-22 RX ORDER — ROSUVASTATIN CALCIUM 10 MG/1
10 TABLET, COATED ORAL DAILY
Qty: 90 TABLET | Refills: 5 | Status: SHIPPED | OUTPATIENT
Start: 2025-08-22